# Patient Record
Sex: FEMALE | Race: BLACK OR AFRICAN AMERICAN | Employment: OTHER | URBAN - METROPOLITAN AREA
[De-identification: names, ages, dates, MRNs, and addresses within clinical notes are randomized per-mention and may not be internally consistent; named-entity substitution may affect disease eponyms.]

---

## 2022-06-26 ENCOUNTER — APPOINTMENT (OUTPATIENT)
Dept: GENERAL RADIOLOGY | Age: 77
DRG: 177 | End: 2022-06-26
Payer: MEDICARE

## 2022-06-26 ENCOUNTER — HOSPITAL ENCOUNTER (INPATIENT)
Age: 77
LOS: 3 days | Discharge: HOME | DRG: 177 | End: 2022-06-30
Attending: EMERGENCY MEDICINE | Admitting: INTERNAL MEDICINE
Payer: MEDICARE

## 2022-06-26 DIAGNOSIS — R09.02 HYPOXIA: ICD-10-CM

## 2022-06-26 DIAGNOSIS — R11.2 NAUSEA AND VOMITING, INTRACTABILITY OF VOMITING NOT SPECIFIED, UNSPECIFIED VOMITING TYPE: Primary | ICD-10-CM

## 2022-06-26 DIAGNOSIS — U07.1 COVID-19 VIRUS INFECTION: ICD-10-CM

## 2022-06-26 LAB
ALBUMIN SERPL-MCNC: 3.6 G/DL (ref 3.5–5.2)
ALBUMIN SERPL-MCNC: 3.7 G/DL (ref 3.5–5.2)
ALP BLD-CCNC: 79 U/L (ref 35–104)
ALP BLD-CCNC: 81 U/L (ref 35–104)
ALT SERPL-CCNC: 12 U/L (ref 0–32)
ALT SERPL-CCNC: 13 U/L (ref 0–32)
ANION GAP SERPL CALCULATED.3IONS-SCNC: 11 MMOL/L (ref 7–16)
ANION GAP SERPL CALCULATED.3IONS-SCNC: 11 MMOL/L (ref 7–16)
AST SERPL-CCNC: 18 U/L (ref 0–31)
AST SERPL-CCNC: 19 U/L (ref 0–31)
BASOPHILS ABSOLUTE: 0.02 E9/L (ref 0–0.2)
BASOPHILS RELATIVE PERCENT: 0.2 % (ref 0–2)
BILIRUB SERPL-MCNC: 0.2 MG/DL (ref 0–1.2)
BILIRUB SERPL-MCNC: 0.2 MG/DL (ref 0–1.2)
BUN BLDV-MCNC: 11 MG/DL (ref 6–23)
BUN BLDV-MCNC: 12 MG/DL (ref 6–23)
CALCIUM SERPL-MCNC: 8.4 MG/DL (ref 8.6–10.2)
CALCIUM SERPL-MCNC: 8.6 MG/DL (ref 8.6–10.2)
CHLORIDE BLD-SCNC: 102 MMOL/L (ref 98–107)
CHLORIDE BLD-SCNC: 104 MMOL/L (ref 98–107)
CO2: 22 MMOL/L (ref 22–29)
CO2: 23 MMOL/L (ref 22–29)
CREAT SERPL-MCNC: 0.8 MG/DL (ref 0.5–1)
CREAT SERPL-MCNC: 0.9 MG/DL (ref 0.5–1)
EOSINOPHILS ABSOLUTE: 0 E9/L (ref 0.05–0.5)
EOSINOPHILS RELATIVE PERCENT: 0 % (ref 0–6)
GFR AFRICAN AMERICAN: >60
GFR AFRICAN AMERICAN: >60
GFR NON-AFRICAN AMERICAN: >60 ML/MIN/1.73
GFR NON-AFRICAN AMERICAN: >60 ML/MIN/1.73
GLUCOSE BLD-MCNC: 147 MG/DL (ref 74–99)
GLUCOSE BLD-MCNC: 151 MG/DL (ref 74–99)
HCT VFR BLD CALC: 36.4 % (ref 34–48)
HEMOGLOBIN: 11.8 G/DL (ref 11.5–15.5)
IMMATURE GRANULOCYTES #: 0.04 E9/L
IMMATURE GRANULOCYTES %: 0.4 % (ref 0–5)
LACTIC ACID, SEPSIS: 1.2 MMOL/L (ref 0.5–1.9)
LACTIC ACID, SEPSIS: 1.6 MMOL/L (ref 0.5–1.9)
LIPASE: 24 U/L (ref 13–60)
LIPASE: 24 U/L (ref 13–60)
LYMPHOCYTES ABSOLUTE: 0.6 E9/L (ref 1.5–4)
LYMPHOCYTES RELATIVE PERCENT: 6.4 % (ref 20–42)
MCH RBC QN AUTO: 27.6 PG (ref 26–35)
MCHC RBC AUTO-ENTMCNC: 32.4 % (ref 32–34.5)
MCV RBC AUTO: 85.2 FL (ref 80–99.9)
MONOCYTES ABSOLUTE: 0.59 E9/L (ref 0.1–0.95)
MONOCYTES RELATIVE PERCENT: 6.3 % (ref 2–12)
NEUTROPHILS ABSOLUTE: 8.1 E9/L (ref 1.8–7.3)
NEUTROPHILS RELATIVE PERCENT: 86.7 % (ref 43–80)
PDW BLD-RTO: 15.5 FL (ref 11.5–15)
PLATELET # BLD: 261 E9/L (ref 130–450)
PMV BLD AUTO: 9.1 FL (ref 7–12)
POTASSIUM REFLEX MAGNESIUM: 4 MMOL/L (ref 3.5–5)
POTASSIUM REFLEX MAGNESIUM: 4.1 MMOL/L (ref 3.5–5)
PRO-BNP: 72 PG/ML (ref 0–450)
RBC # BLD: 4.27 E12/L (ref 3.5–5.5)
REASON FOR REJECTION: NORMAL
REJECTED TEST: NORMAL
SARS-COV-2, NAAT: DETECTED
SODIUM BLD-SCNC: 135 MMOL/L (ref 132–146)
SODIUM BLD-SCNC: 138 MMOL/L (ref 132–146)
TOTAL PROTEIN: 7.7 G/DL (ref 6.4–8.3)
TOTAL PROTEIN: 7.7 G/DL (ref 6.4–8.3)
WBC # BLD: 9.4 E9/L (ref 4.5–11.5)

## 2022-06-26 PROCEDURE — 99285 EMERGENCY DEPT VISIT HI MDM: CPT

## 2022-06-26 PROCEDURE — 87040 BLOOD CULTURE FOR BACTERIA: CPT

## 2022-06-26 PROCEDURE — 6360000002 HC RX W HCPCS: Performed by: EMERGENCY MEDICINE

## 2022-06-26 PROCEDURE — 85025 COMPLETE CBC W/AUTO DIFF WBC: CPT

## 2022-06-26 PROCEDURE — 6370000000 HC RX 637 (ALT 250 FOR IP): Performed by: EMERGENCY MEDICINE

## 2022-06-26 PROCEDURE — 96374 THER/PROPH/DIAG INJ IV PUSH: CPT

## 2022-06-26 PROCEDURE — 93005 ELECTROCARDIOGRAM TRACING: CPT | Performed by: EMERGENCY MEDICINE

## 2022-06-26 PROCEDURE — 83690 ASSAY OF LIPASE: CPT

## 2022-06-26 PROCEDURE — 83880 ASSAY OF NATRIURETIC PEPTIDE: CPT

## 2022-06-26 PROCEDURE — 87635 SARS-COV-2 COVID-19 AMP PRB: CPT

## 2022-06-26 PROCEDURE — 83605 ASSAY OF LACTIC ACID: CPT

## 2022-06-26 PROCEDURE — 2700000000 HC OXYGEN THERAPY PER DAY

## 2022-06-26 PROCEDURE — 80053 COMPREHEN METABOLIC PANEL: CPT

## 2022-06-26 PROCEDURE — 71045 X-RAY EXAM CHEST 1 VIEW: CPT

## 2022-06-26 PROCEDURE — 36415 COLL VENOUS BLD VENIPUNCTURE: CPT

## 2022-06-26 RX ORDER — ACETAMINOPHEN 325 MG/1
650 TABLET ORAL ONCE
Status: COMPLETED | OUTPATIENT
Start: 2022-06-26 | End: 2022-06-26

## 2022-06-26 RX ORDER — ONDANSETRON 2 MG/ML
4 INJECTION INTRAMUSCULAR; INTRAVENOUS ONCE
Status: COMPLETED | OUTPATIENT
Start: 2022-06-26 | End: 2022-06-26

## 2022-06-26 RX ADMIN — ACETAMINOPHEN 325MG 650 MG: 325 TABLET ORAL at 21:34

## 2022-06-26 RX ADMIN — ONDANSETRON 4 MG: 2 INJECTION INTRAMUSCULAR; INTRAVENOUS at 21:33

## 2022-06-27 ENCOUNTER — APPOINTMENT (OUTPATIENT)
Dept: CT IMAGING | Age: 77
DRG: 177 | End: 2022-06-27
Payer: MEDICARE

## 2022-06-27 PROBLEM — R09.02 HYPOXIA: Status: ACTIVE | Noted: 2022-06-27

## 2022-06-27 LAB
BILIRUBIN URINE: NEGATIVE
BLOOD, URINE: NEGATIVE
C-REACTIVE PROTEIN: 6 MG/DL (ref 0–0.4)
CLARITY: CLEAR
COLOR: YELLOW
D DIMER: 217 NG/ML DDU
EKG ATRIAL RATE: 108 BPM
EKG P AXIS: 66 DEGREES
EKG P-R INTERVAL: 194 MS
EKG Q-T INTERVAL: 288 MS
EKG QRS DURATION: 68 MS
EKG QTC CALCULATION (BAZETT): 385 MS
EKG R AXIS: 35 DEGREES
EKG T AXIS: 81 DEGREES
EKG VENTRICULAR RATE: 108 BPM
GLUCOSE URINE: NEGATIVE MG/DL
KETONES, URINE: NEGATIVE MG/DL
LEUKOCYTE ESTERASE, URINE: NEGATIVE
METER GLUCOSE: 119 MG/DL (ref 74–99)
METER GLUCOSE: 191 MG/DL (ref 74–99)
METER GLUCOSE: 195 MG/DL (ref 74–99)
NITRITE, URINE: NEGATIVE
PH UA: 6.5 (ref 5–9)
PROCALCITONIN: 0.15 NG/ML (ref 0–0.08)
PROTEIN UA: NEGATIVE MG/DL
SPECIFIC GRAVITY UA: 1.01 (ref 1–1.03)
TROPONIN, HIGH SENSITIVITY: <6 NG/L (ref 0–9)
UROBILINOGEN, URINE: 1 E.U./DL

## 2022-06-27 PROCEDURE — 84484 ASSAY OF TROPONIN QUANT: CPT

## 2022-06-27 PROCEDURE — 6370000000 HC RX 637 (ALT 250 FOR IP): Performed by: EMERGENCY MEDICINE

## 2022-06-27 PROCEDURE — 2580000003 HC RX 258: Performed by: RADIOLOGY

## 2022-06-27 PROCEDURE — 6370000000 HC RX 637 (ALT 250 FOR IP): Performed by: INTERNAL MEDICINE

## 2022-06-27 PROCEDURE — 36415 COLL VENOUS BLD VENIPUNCTURE: CPT

## 2022-06-27 PROCEDURE — 6360000004 HC RX CONTRAST MEDICATION: Performed by: RADIOLOGY

## 2022-06-27 PROCEDURE — 1200000000 HC SEMI PRIVATE

## 2022-06-27 PROCEDURE — 85378 FIBRIN DEGRADE SEMIQUANT: CPT

## 2022-06-27 PROCEDURE — 82962 GLUCOSE BLOOD TEST: CPT

## 2022-06-27 PROCEDURE — 81003 URINALYSIS AUTO W/O SCOPE: CPT

## 2022-06-27 PROCEDURE — 6360000002 HC RX W HCPCS: Performed by: INTERNAL MEDICINE

## 2022-06-27 PROCEDURE — 74177 CT ABD & PELVIS W/CONTRAST: CPT

## 2022-06-27 PROCEDURE — 86140 C-REACTIVE PROTEIN: CPT

## 2022-06-27 PROCEDURE — 71275 CT ANGIOGRAPHY CHEST: CPT

## 2022-06-27 PROCEDURE — 2700000000 HC OXYGEN THERAPY PER DAY

## 2022-06-27 PROCEDURE — 84145 PROCALCITONIN (PCT): CPT

## 2022-06-27 RX ORDER — BENZONATATE 100 MG/1
100 CAPSULE ORAL ONCE
Status: COMPLETED | OUTPATIENT
Start: 2022-06-27 | End: 2022-06-27

## 2022-06-27 RX ORDER — AMLODIPINE BESYLATE 5 MG/1
5 TABLET ORAL DAILY
Status: DISCONTINUED | OUTPATIENT
Start: 2022-06-27 | End: 2022-06-29

## 2022-06-27 RX ORDER — ACETAMINOPHEN 325 MG/1
650 TABLET ORAL EVERY 6 HOURS PRN
Status: DISCONTINUED | OUTPATIENT
Start: 2022-06-27 | End: 2022-06-30 | Stop reason: HOSPADM

## 2022-06-27 RX ORDER — AMLODIPINE BESYLATE 5 MG/1
5 TABLET ORAL DAILY
COMMUNITY
Start: 2021-12-09

## 2022-06-27 RX ORDER — PRAVASTATIN SODIUM 20 MG
20 TABLET ORAL NIGHTLY
COMMUNITY
Start: 2021-12-09

## 2022-06-27 RX ORDER — DEXTROSE MONOHYDRATE 25 G/50ML
12.5 INJECTION, SOLUTION INTRAVENOUS PRN
Status: DISCONTINUED | OUTPATIENT
Start: 2022-06-27 | End: 2022-06-30 | Stop reason: HOSPADM

## 2022-06-27 RX ORDER — DEXTROSE MONOHYDRATE 50 MG/ML
100 INJECTION, SOLUTION INTRAVENOUS PRN
Status: DISCONTINUED | OUTPATIENT
Start: 2022-06-27 | End: 2022-06-30 | Stop reason: HOSPADM

## 2022-06-27 RX ORDER — ACETAMINOPHEN 650 MG/1
650 SUPPOSITORY RECTAL EVERY 6 HOURS PRN
Status: DISCONTINUED | OUTPATIENT
Start: 2022-06-27 | End: 2022-06-30 | Stop reason: HOSPADM

## 2022-06-27 RX ORDER — INSULIN LISPRO 100 [IU]/ML
0-6 INJECTION, SOLUTION INTRAVENOUS; SUBCUTANEOUS NIGHTLY
Status: DISCONTINUED | OUTPATIENT
Start: 2022-06-27 | End: 2022-06-30 | Stop reason: HOSPADM

## 2022-06-27 RX ORDER — SODIUM CHLORIDE 0.9 % (FLUSH) 0.9 %
10 SYRINGE (ML) INJECTION PRN
Status: COMPLETED | OUTPATIENT
Start: 2022-06-27 | End: 2022-06-27

## 2022-06-27 RX ORDER — ENOXAPARIN SODIUM 100 MG/ML
30 INJECTION SUBCUTANEOUS 2 TIMES DAILY
Status: DISCONTINUED | OUTPATIENT
Start: 2022-06-27 | End: 2022-06-30 | Stop reason: HOSPADM

## 2022-06-27 RX ORDER — INSULIN LISPRO 100 [IU]/ML
0-12 INJECTION, SOLUTION INTRAVENOUS; SUBCUTANEOUS
Status: DISCONTINUED | OUTPATIENT
Start: 2022-06-27 | End: 2022-06-30 | Stop reason: HOSPADM

## 2022-06-27 RX ORDER — DEXAMETHASONE 4 MG/1
6 TABLET ORAL DAILY
Status: DISCONTINUED | OUTPATIENT
Start: 2022-06-27 | End: 2022-06-30 | Stop reason: HOSPADM

## 2022-06-27 RX ORDER — PRAVASTATIN SODIUM 20 MG
20 TABLET ORAL NIGHTLY
Status: DISCONTINUED | OUTPATIENT
Start: 2022-06-27 | End: 2022-06-30 | Stop reason: HOSPADM

## 2022-06-27 RX ADMIN — METFORMIN HYDROCHLORIDE 500 MG: 500 TABLET ORAL at 11:21

## 2022-06-27 RX ADMIN — BENZONATATE 100 MG: 100 CAPSULE ORAL at 03:08

## 2022-06-27 RX ADMIN — ENOXAPARIN SODIUM 30 MG: 100 INJECTION SUBCUTANEOUS at 21:37

## 2022-06-27 RX ADMIN — INSULIN LISPRO 2 UNITS: 100 INJECTION, SOLUTION INTRAVENOUS; SUBCUTANEOUS at 17:18

## 2022-06-27 RX ADMIN — DEXAMETHASONE 6 MG: 4 TABLET ORAL at 11:21

## 2022-06-27 RX ADMIN — Medication 10 ML: at 01:13

## 2022-06-27 RX ADMIN — ENOXAPARIN SODIUM 30 MG: 100 INJECTION SUBCUTANEOUS at 11:21

## 2022-06-27 RX ADMIN — INSULIN LISPRO 1 UNITS: 100 INJECTION, SOLUTION INTRAVENOUS; SUBCUTANEOUS at 21:00

## 2022-06-27 RX ADMIN — IOPAMIDOL 75 ML: 755 INJECTION, SOLUTION INTRAVENOUS at 01:13

## 2022-06-27 RX ADMIN — BARICITINIB 4 MG: 2 TABLET, FILM COATED ORAL at 17:28

## 2022-06-27 RX ADMIN — PRAVASTATIN SODIUM 20 MG: 20 TABLET ORAL at 21:36

## 2022-06-27 RX ADMIN — METFORMIN HYDROCHLORIDE 500 MG: 500 TABLET ORAL at 17:28

## 2022-06-27 RX ADMIN — AMLODIPINE BESYLATE 5 MG: 5 TABLET ORAL at 11:21

## 2022-06-27 ASSESSMENT — PAIN SCALES - GENERAL
PAINLEVEL_OUTOF10: 0
PAINLEVEL_OUTOF10: 0

## 2022-06-27 NOTE — H&P
Hospital Medicine History & Physical      PCP: No primary care provider on file. Date of Admission: 6/26/2022    Date of Service: Pt seen/examined on 6/27/2022 and Admitted to Inpatient with expected LOS greater than two midnights due to medical therapy. Chief Complaint: Cough, shortness of breath      History Of Present Illness:      68 y.o. female who presented to [facility] with cough. [facility] She then started having coughing issues. Symptoms have been going on for the last 2 days. She denies any wheezing or shortness of breath. Evaluation in the emergency room revealed hypoxia. She does not have a underlying history of lung disorder. Patient was then admitted because of COVID-19 infection and hypoxia. At the time of questioning she denies any significant issues. She has an underlying history of hypertension, hyperlipidemia, diabetes mellitus type 2. 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 Medications Prior to Admission:      Prior to Admission medications    Medication Sig Start Date End Date Taking? Authorizing Provider   amLODIPine (NORVASC) 5 MG tablet Take 5 mg by mouth daily 12/9/21  Yes Historical Provider, MD   metFORMIN (GLUCOPHAGE) 500 MG tablet Take 500 mg by mouth 2 times daily (with meals) 12/9/21  Yes Historical Provider, MD   pravastatin (PRAVACHOL) 20 MG tablet Take 20 mg by mouth nightly 12/9/21  Yes Historical Provider, MD       Allergies:  Patient has no known allergies. Social History:      TOBACCO:   has no history on file for tobacco use. ETOH:   has no history on file for alcohol use. Family History:          No family history on file.     REVIEW OF SYSTEMS:   Pertinent positives as noted in the HPI. All other systems reviewed and negative. PHYSICAL EXAM:    /80   Pulse (!) 101   Temp 99.1 °F (37.3 °C) (Oral)   Resp 24   Ht 5' 2\" (1.575 m)   Wt 135 lb (61.2 kg)   SpO2 98%   BMI 24.69 kg/m²     General appearance:  No apparent distress, appears stated age and cooperative. HEENT:  Normal cephalic, atraumatic without obvious deformity. Pupils equal, round, and reactive to light. Extra ocular muscles intact. Conjunctivae/corneas clear. Neck: Supple, with full range of motion. No jugular venous distention. Trachea midline. Respiratory:  Normal respiratory effort. Clear to auscultation, bilaterally without Rales/Wheezes/Rhonchi. Cardiovascular:  Regular rate and rhythm with normal S1/S2 without murmurs, rubs or gallops. Abdomen: Soft, non-tender, non-distended with normal bowel sounds. Musculoskeletal:  No clubbing, cyanosis or edema bilaterally. Full range of motion without deformity. Skin: Skin color, texture, turgor normal.  No rashes or lesions. Neurologic:  Neurovascularly intact without any focal sensory/motor deficits. Cranial nerves: II-XII intact, grossly non-focal.  Psychiatric:  Alert and oriented, thought content appropriate, normal insight        Labs:     Recent Labs     06/26/22  2323   WBC 9.4   HGB 11.8   HCT 36.4        Recent Labs     06/26/22  2140 06/26/22  2244    138   K 4.1 4.0    104   CO2 22 23   BUN 12 11   CREATININE 0.9 0.8   CALCIUM 8.4* 8.6     Recent Labs     06/26/22 2140 06/26/22  2244   AST 19 18   ALT 13 12   BILITOT 0.2 0.2   ALKPHOS 79 81     No results for input(s): INR in the last 72 hours. No results for input(s): [place] in the last 72 hours.     Urinalysis:      Lab Results   Component Value Date    NITRU Negative 06/27/2022    BLOODU Negative 06/27/2022    SPECGRAV 1.015 06/27/2022    GLUCOSEU Negative 06/27/2022         ASSESSMENT:    Active Hospital Problems    Diagnosis Date Noted    Hypoxia [R09.02] 06/27/2022     Priority: Medium   Hypertension  Hyperlipidemia  Diabetes mellitus type 2  COVID-19 infection with mild hypoxia  History of tobacco abuse  Prior history of hemorrhagic CVA with left-sided weakness    PLAN:  She is on 2 L of oxygen  CTA did not show any pulmonary embolism  Check CRP  Pharmacy regarding possible remdesivir versus baricitinib based on CRP level      DVT Prophylaxis: Lovenox  Diet: ADULT DIET;  Regular; 4 carb choices (60 gm/meal)  Code Status: Full Code      Dispo -remains inpatient       Babs Thomas MD

## 2022-06-27 NOTE — ED PROVIDER NOTES
HPI:  6/26/22, Time: 8:27 PM EDT         Freedom Zacarias is a 68 y.o. female presenting to the ED for nausea vomiting, beginning 1 hour ago. The complaint has been persistent, moderate in severity, and worsened by nothing. Patient presenting here because the nausea vomiting. Patient having no abdominal pain. Patient reporting no chest pain she reports a mild cough. Patient reports that she was in a family reunion yesterday. Patient reports no illness up until today. Patient's had no diarrhea there is no history of hematemesis there is no black or tarry stools. Patient reporting no leg pain or swelling. Patient reporting no headache. Patient reporting no syncopal event. ROS:   Pertinent positives and negatives are stated within HPI, all other systems reviewed and are negative.  --------------------------------------------- PAST HISTORY ---------------------------------------------  Past Medical History:  has no past medical history on file. Past Surgical History:  has no past surgical history on file. Social History:      Family History: family history is not on file. The patients home medications have been reviewed. Allergies: Patient has no known allergies. ---------------------------------------------------PHYSICAL EXAM--------------------------------------    Constitutional/General: Alert and oriented x3,  Head: Normocephalic and atraumatic  Eyes: PERRL, EOMI  Mouth: Oropharynx clear, handling secretions, no trismus  Neck: Supple, full ROM, non tender to palpation in the midline, no stridor, no crepitus, no meningeal signs  Pulmonary: Lungs crackles  Cardiovascular: Tachycardic regular rhythm. No murmurs, gallops, or rubs. 2+ distal pulses  Chest: no chest wall tenderness  Abdomen: Soft. Non tender. Non distended. +BS. No rebound, guarding, or rigidity. No pulsatile masses appreciated. Musculoskeletal: Moves all extremities x 4.  Warm and well perfused, no clubbing, cyanosis, or edema. Capillary refill <3 seconds  Skin: warm and dry. No rashes. Neurologic: GCS 15, CN 2-12 grossly intact, no focal deficits, symmetric strength 5/5 in the upper and lower extremities bilaterally  Psych: Normal Affect    -------------------------------------------------- RESULTS -------------------------------------------------  I have personally reviewed all laboratory and imaging results for this patient. Results are listed below.      LABS:  Results for orders placed or performed during the hospital encounter of 06/26/22   COVID-19, Rapid    Specimen: Nasopharyngeal Swab   Result Value Ref Range    SARS-CoV-2, NAAT DETECTED (A) Not Detected   Lactate, Sepsis   Result Value Ref Range    Lactic Acid, Sepsis 1.6 0.5 - 1.9 mmol/L   Lactate, Sepsis   Result Value Ref Range    Lactic Acid, Sepsis 1.2 0.5 - 1.9 mmol/L   Comprehensive Metabolic Panel w/ Reflex to MG   Result Value Ref Range    Sodium 135 132 - 146 mmol/L    Potassium reflex Magnesium 4.1 3.5 - 5.0 mmol/L    Chloride 102 98 - 107 mmol/L    CO2 22 22 - 29 mmol/L    Anion Gap 11 7 - 16 mmol/L    Glucose 151 (H) 74 - 99 mg/dL    BUN 12 6 - 23 mg/dL    CREATININE 0.9 0.5 - 1.0 mg/dL    GFR Non-African American >60 >=60 mL/min/1.73    GFR African American >60     Calcium 8.4 (L) 8.6 - 10.2 mg/dL    Total Protein 7.7 6.4 - 8.3 g/dL    Albumin 3.7 3.5 - 5.2 g/dL    Total Bilirubin 0.2 0.0 - 1.2 mg/dL    Alkaline Phosphatase 79 35 - 104 U/L    ALT 13 0 - 32 U/L    AST 19 0 - 31 U/L   Urinalysis   Result Value Ref Range    Color, UA Yellow Straw/Yellow    Clarity, UA Clear Clear    Glucose, Ur Negative Negative mg/dL    Bilirubin Urine Negative Negative    Ketones, Urine Negative Negative mg/dL    Specific Gravity, UA 1.015 1.005 - 1.030    Blood, Urine Negative Negative    pH, UA 6.5 5.0 - 9.0    Protein, UA Negative Negative mg/dL    Urobilinogen, Urine 1.0 <2.0 E.U./dL    Nitrite, Urine Negative Negative    Leukocyte Esterase, Urine Negative Negative Lipase   Result Value Ref Range    Lipase 24 13 - 60 U/L   SPECIMEN REJECTION   Result Value Ref Range    Rejected Test CBCWD     Reason for Rejection see below    Brain Natriuretic Peptide   Result Value Ref Range    Pro-BNP 72 0 - 450 pg/mL   Comprehensive Metabolic Panel w/ Reflex to MG   Result Value Ref Range    Sodium 138 132 - 146 mmol/L    Potassium reflex Magnesium 4.0 3.5 - 5.0 mmol/L    Chloride 104 98 - 107 mmol/L    CO2 23 22 - 29 mmol/L    Anion Gap 11 7 - 16 mmol/L    Glucose 147 (H) 74 - 99 mg/dL    BUN 11 6 - 23 mg/dL    CREATININE 0.8 0.5 - 1.0 mg/dL    GFR Non-African American >60 >=60 mL/min/1.73    GFR African American >60     Calcium 8.6 8.6 - 10.2 mg/dL    Total Protein 7.7 6.4 - 8.3 g/dL    Albumin 3.6 3.5 - 5.2 g/dL    Total Bilirubin 0.2 0.0 - 1.2 mg/dL    Alkaline Phosphatase 81 35 - 104 U/L    ALT 12 0 - 32 U/L    AST 18 0 - 31 U/L   Lipase   Result Value Ref Range    Lipase 24 13 - 60 U/L   CBC with Auto Differential   Result Value Ref Range    WBC 9.4 4.5 - 11.5 E9/L    RBC 4.27 3.50 - 5.50 E12/L    Hemoglobin 11.8 11.5 - 15.5 g/dL    Hematocrit 36.4 34.0 - 48.0 %    MCV 85.2 80.0 - 99.9 fL    MCH 27.6 26.0 - 35.0 pg    MCHC 32.4 32.0 - 34.5 %    RDW 15.5 (H) 11.5 - 15.0 fL    Platelets 128 773 - 078 E9/L    MPV 9.1 7.0 - 12.0 fL    Neutrophils % 86.7 (H) 43.0 - 80.0 %    Immature Granulocytes % 0.4 0.0 - 5.0 %    Lymphocytes % 6.4 (L) 20.0 - 42.0 %    Monocytes % 6.3 2.0 - 12.0 %    Eosinophils % 0.0 0.0 - 6.0 %    Basophils % 0.2 0.0 - 2.0 %    Neutrophils Absolute 8.10 (H) 1.80 - 7.30 E9/L    Immature Granulocytes # 0.04 E9/L    Lymphocytes Absolute 0.60 (L) 1.50 - 4.00 E9/L    Monocytes Absolute 0.59 0.10 - 0.95 E9/L    Eosinophils Absolute 0.00 (L) 0.05 - 0.50 E9/L    Basophils Absolute 0.02 0.00 - 0.20 E9/L   EKG 12 lead   Result Value Ref Range    Ventricular Rate 108 BPM    Atrial Rate 108 BPM    P-R Interval 194 ms    QRS Duration 68 ms    Q-T Interval 288 ms    QTc COVID testing was positive. Abdomen is soft. Patient did vomit here again. Patient was noted to be extremely hypoxic with ambulation. Patient x-ray shows no findings of infiltrate. CT of the chest and abdomen were ordered. Patient reexamined multiple times was given Tylenol and the fever has come down. Patient will be admitted to intermediate bed. Re-Evaluations:             Patient reevaluated several times here in the emergency room. Patient noted to be hypoxic here with exertion. Pulse ox dropped to 80%. Patient having no chest pain her abdomen is soft there is no rebound or guarding. Chest x-ray is negative she is COVID-positive. Patient did undergo CTs of her chest and her abdomen. Patient still requiring supplemental oxygen. Consultations:             Internal medicine    Critical Care:     Please note that the withdrawal or failure to initiate urgent interventions for this patient would likely result in a life threatening deterioration or permanent disability. Accordingly this patient received 30 minutes of critical care time, excluding separately billable procedures. This patient's ED course included: a personal history and physicial eaxmination    This patient has been closely monitored during their ED course. Counseling: The emergency provider has spoken with the patient and discussed todays results, in addition to providing specific details for the plan of care and counseling regarding the diagnosis and prognosis. Questions are answered at this time and they are agreeable with the plan.       --------------------------------- IMPRESSION AND DISPOSITION ---------------------------------    IMPRESSION  1. Nausea and vomiting, intractability of vomiting not specified, unspecified vomiting type    2. Hypoxia    3.  COVID-19 virus infection        DISPOSITION  Disposition: Admit to telemetry  Patient condition is stable        NOTE: This report was transcribed using voice

## 2022-06-28 LAB
ALBUMIN SERPL-MCNC: 3.6 G/DL (ref 3.5–5.2)
ALP BLD-CCNC: 72 U/L (ref 35–104)
ALT SERPL-CCNC: 18 U/L (ref 0–32)
ANION GAP SERPL CALCULATED.3IONS-SCNC: 10 MMOL/L (ref 7–16)
AST SERPL-CCNC: 37 U/L (ref 0–31)
BASOPHILS ABSOLUTE: 0.01 E9/L (ref 0–0.2)
BASOPHILS RELATIVE PERCENT: 0.2 % (ref 0–2)
BILIRUB SERPL-MCNC: <0.2 MG/DL (ref 0–1.2)
BUN BLDV-MCNC: 12 MG/DL (ref 6–23)
C-REACTIVE PROTEIN: 9.2 MG/DL (ref 0–0.4)
CALCIUM SERPL-MCNC: 8.7 MG/DL (ref 8.6–10.2)
CHLORIDE BLD-SCNC: 102 MMOL/L (ref 98–107)
CO2: 25 MMOL/L (ref 22–29)
CREAT SERPL-MCNC: 0.8 MG/DL (ref 0.5–1)
D DIMER: 224 NG/ML DDU
EOSINOPHILS ABSOLUTE: 0 E9/L (ref 0.05–0.5)
EOSINOPHILS RELATIVE PERCENT: 0 % (ref 0–6)
GFR AFRICAN AMERICAN: >60
GFR NON-AFRICAN AMERICAN: >60 ML/MIN/1.73
GLUCOSE BLD-MCNC: 177 MG/DL (ref 74–99)
HCT VFR BLD CALC: 37.2 % (ref 34–48)
HEMOGLOBIN: 11.8 G/DL (ref 11.5–15.5)
IMMATURE GRANULOCYTES #: 0.02 E9/L
IMMATURE GRANULOCYTES %: 0.4 % (ref 0–5)
LYMPHOCYTES ABSOLUTE: 1.42 E9/L (ref 1.5–4)
LYMPHOCYTES RELATIVE PERCENT: 31.1 % (ref 20–42)
MCH RBC QN AUTO: 27.4 PG (ref 26–35)
MCHC RBC AUTO-ENTMCNC: 31.7 % (ref 32–34.5)
MCV RBC AUTO: 86.5 FL (ref 80–99.9)
METER GLUCOSE: 141 MG/DL (ref 74–99)
METER GLUCOSE: 148 MG/DL (ref 74–99)
METER GLUCOSE: 150 MG/DL (ref 74–99)
MONOCYTES ABSOLUTE: 0.57 E9/L (ref 0.1–0.95)
MONOCYTES RELATIVE PERCENT: 12.5 % (ref 2–12)
NEUTROPHILS ABSOLUTE: 2.54 E9/L (ref 1.8–7.3)
NEUTROPHILS RELATIVE PERCENT: 55.8 % (ref 43–80)
PDW BLD-RTO: 15.8 FL (ref 11.5–15)
PLATELET # BLD: 282 E9/L (ref 130–450)
PMV BLD AUTO: 9.6 FL (ref 7–12)
POTASSIUM REFLEX MAGNESIUM: 4.6 MMOL/L (ref 3.5–5)
RBC # BLD: 4.3 E12/L (ref 3.5–5.5)
SODIUM BLD-SCNC: 137 MMOL/L (ref 132–146)
TOTAL PROTEIN: 7.7 G/DL (ref 6.4–8.3)
VITAMIN D 25-HYDROXY: 16 NG/ML (ref 30–100)
WBC # BLD: 4.6 E9/L (ref 4.5–11.5)

## 2022-06-28 PROCEDURE — 36415 COLL VENOUS BLD VENIPUNCTURE: CPT

## 2022-06-28 PROCEDURE — 2700000000 HC OXYGEN THERAPY PER DAY

## 2022-06-28 PROCEDURE — XW0DXM6 INTRODUCTION OF BARICITINIB INTO MOUTH AND PHARYNX, EXTERNAL APPROACH, NEW TECHNOLOGY GROUP 6: ICD-10-PCS | Performed by: EMERGENCY MEDICINE

## 2022-06-28 PROCEDURE — 6370000000 HC RX 637 (ALT 250 FOR IP): Performed by: INTERNAL MEDICINE

## 2022-06-28 PROCEDURE — 82306 VITAMIN D 25 HYDROXY: CPT

## 2022-06-28 PROCEDURE — 6360000002 HC RX W HCPCS: Performed by: INTERNAL MEDICINE

## 2022-06-28 PROCEDURE — 3E0333Z INTRODUCTION OF ANTI-INFLAMMATORY INTO PERIPHERAL VEIN, PERCUTANEOUS APPROACH: ICD-10-PCS | Performed by: EMERGENCY MEDICINE

## 2022-06-28 PROCEDURE — 85025 COMPLETE CBC W/AUTO DIFF WBC: CPT

## 2022-06-28 PROCEDURE — 86140 C-REACTIVE PROTEIN: CPT

## 2022-06-28 PROCEDURE — 80053 COMPREHEN METABOLIC PANEL: CPT

## 2022-06-28 PROCEDURE — 82962 GLUCOSE BLOOD TEST: CPT

## 2022-06-28 PROCEDURE — 1200000000 HC SEMI PRIVATE

## 2022-06-28 PROCEDURE — 85378 FIBRIN DEGRADE SEMIQUANT: CPT

## 2022-06-28 RX ADMIN — BARICITINIB 4 MG: 2 TABLET, FILM COATED ORAL at 09:52

## 2022-06-28 RX ADMIN — INSULIN LISPRO 2 UNITS: 100 INJECTION, SOLUTION INTRAVENOUS; SUBCUTANEOUS at 09:33

## 2022-06-28 RX ADMIN — AMLODIPINE BESYLATE 5 MG: 5 TABLET ORAL at 09:52

## 2022-06-28 RX ADMIN — ENOXAPARIN SODIUM 30 MG: 100 INJECTION SUBCUTANEOUS at 21:00

## 2022-06-28 RX ADMIN — PRAVASTATIN SODIUM 20 MG: 20 TABLET ORAL at 21:00

## 2022-06-28 RX ADMIN — METFORMIN HYDROCHLORIDE 500 MG: 500 TABLET ORAL at 09:52

## 2022-06-28 RX ADMIN — METFORMIN HYDROCHLORIDE 500 MG: 500 TABLET ORAL at 18:45

## 2022-06-28 RX ADMIN — DEXAMETHASONE 6 MG: 4 TABLET ORAL at 09:52

## 2022-06-28 ASSESSMENT — PAIN SCALES - GENERAL
PAINLEVEL_OUTOF10: 0

## 2022-06-28 NOTE — PROGRESS NOTES
Hospitalist Progress Note      PCP: No primary care provider on file. Date of Admission: 6/26/2022    Chief Complaint: Cough, generalized weakness    Hospital Course: Presented to the emergency room because of cough, generalized weakness. Patient was noted to be hypoxic. She was positive for COVID-19 infection. Patient was then admitted for further evaluation treatment. She was started on steroids, baricitinib. Subjective: Feeling better  Cough is about the same      Medications:  Reviewed    Infusion Medications    dextrose       Scheduled Medications    amLODIPine  5 mg Oral Daily    metFORMIN  500 mg Oral BID WC    pravastatin  20 mg Oral Nightly    enoxaparin  30 mg SubCUTAneous BID    dexamethasone  6 mg Oral Daily    insulin lispro  0-12 Units SubCUTAneous TID WC    insulin lispro  0-6 Units SubCUTAneous Nightly    baricitinib  4 mg Oral Daily     PRN Meds: acetaminophen **OR** acetaminophen, glucose, glucagon (rDNA), dextrose, dextrose    No intake or output data in the 24 hours ending 06/28/22 1018    Exam:    BP (!) 145/85   Pulse 88   Temp 97.7 °F (36.5 °C) (Oral)   Resp 17   Ht 5' 2\" (1.575 m)   Wt 135 lb (61.2 kg)   SpO2 97%   BMI 24.69 kg/m²     General appearance: No apparent distress, appears stated age and cooperative. HEENT: Pupils equal, round, and reactive to light. Conjunctivae/corneas clear. Neck: Supple, with full range of motion. No jugular venous distention. Trachea midline. Respiratory:  Normal respiratory effort. Clear to auscultation, bilaterally without Rales/Wheezes/Rhonchi. Cardiovascular: Regular rate and rhythm with normal S1/S2 without murmurs, rubs or gallops. Abdomen: Soft, non-tender, non-distended with normal bowel sounds. Musculoskeletal: No clubbing, cyanosis or edema bilaterally. Full range of motion without deformity. Skin: Skin color, texture, turgor normal.  No rashes or lesions.   Neurologic:  Neurovascularly intact without any focal sensory/motor deficits. Cranial nerves: II-XII intact, grossly non-focal.  Psychiatric: Alert and oriented, thought content appropriate, normal insight    Labs:   Recent Labs     06/26/22 2323 06/28/22  0450   WBC 9.4 4.6   HGB 11.8 11.8   HCT 36.4 37.2    282     Recent Labs     06/26/22 2140 06/26/22 2244 06/28/22  0450    138 137   K 4.1 4.0 4.6    104 102   CO2 22 23 25   BUN 12 11 12   CREATININE 0.9 0.8 0.8   CALCIUM 8.4* 8.6 8.7     Recent Labs     06/26/22 2140 06/26/22 2244 06/28/22  0450   AST 19 18 37*   ALT 13 12 18   BILITOT 0.2 0.2 <0.2   ALKPHOS 79 81 72     No results for input(s): INR in the last 72 hours. No results for input(s): [place] in the last 72 hours. Assessment/Plan:    Active Hospital Problems    Diagnosis Date Noted    Hypoxia [R09.02] 06/27/2022     Priority: Medium   COVID-19 infection  Hypertension by history  Hyperlipidemia  Diabetes mellitus type 2  Ex-smoker  Previous history of hemorrhagic stroke    Currently on dexamethasone, baricitinib  CRP has worsened to 9.2-monitor closely  No discharge plan today  Wean oxygen as able  Procalcitonin is elevated 0.15    DVT Prophylaxis: Lovenox   diet: ADULT DIET;  Regular; 4 carb choices (60 gm/meal)  Code Status: Full Code        Dispo -Home once stable    Babs Thomas MD

## 2022-06-28 NOTE — PROGRESS NOTES
Pharmacy Consultation Note    Consult date: 6/28/2022  Physician/provider: Dr. Prabhakar Alfaro has been consulted to evaluate criteria for Baricitinib or Remdesivir therapy. Based on the algorithm, the patient DOES currently meet [facility] P&T approved Covid-19 treatment criteria for Baricitinib. Discussed with Dr. Richard Griffith.      Thank you for the consult,  Clovis Prater [facility]

## 2022-06-28 NOTE — PLAN OF CARE
Problem: Discharge Planning  Goal: Discharge to home or other facility with appropriate resources  Outcome: Progressing     Problem: Safety - Adult  Goal: Free from fall injury  Outcome: Progressing     Problem: ABCDS Injury Assessment  Goal: Absence of physical injury  Outcome: Progressing     Problem: Pain  Goal: Verbalizes/displays adequate comfort level or baseline comfort level  Outcome: Progressing  Flowsheets (Taken 6/28/2022 0402)  Verbalizes/displays adequate comfort level or baseline comfort level: Encourage patient to monitor pain and request assistance

## 2022-06-28 NOTE — PLAN OF CARE
Problem: Discharge Planning  Goal: Discharge to home or other facility with appropriate resources  6/28/2022 1247 by Ayde Danielle RN  Outcome: Progressing  6/28/2022 0422 by Mari Churchill RN  Outcome: Progressing     Problem: Safety - Adult  Goal: Free from fall injury  6/28/2022 1247 by Ayde Danielle RN  Outcome: Progressing  6/28/2022 0422 by Mari Churchill RN  Outcome: Progressing     Problem: ABCDS Injury Assessment  Goal: Absence of physical injury  6/28/2022 1247 by Ayde Danielle RN  Outcome: Progressing  6/28/2022 0422 by Mari Churchill RN  Outcome: Progressing     Problem: Pain  Goal: Verbalizes/displays adequate comfort level or baseline comfort level  6/28/2022 1247 by Ayde Danielle RN  Outcome: Progressing  6/28/2022 0422 by Mari Churchill RN  Outcome: Progressing  Flowsheets (Taken 6/28/2022 0402)  Verbalizes/displays adequate comfort level or baseline comfort level: Encourage patient to monitor pain and request assistance

## 2022-06-29 LAB
C-REACTIVE PROTEIN: 4.5 MG/DL (ref 0–0.4)
D DIMER: 204 NG/ML DDU
METER GLUCOSE: 127 MG/DL (ref 74–99)
METER GLUCOSE: 128 MG/DL (ref 74–99)
METER GLUCOSE: 186 MG/DL (ref 74–99)
METER GLUCOSE: 194 MG/DL (ref 74–99)
PROCALCITONIN: 0.08 NG/ML (ref 0–0.08)

## 2022-06-29 PROCEDURE — 6360000002 HC RX W HCPCS: Performed by: INTERNAL MEDICINE

## 2022-06-29 PROCEDURE — 6370000000 HC RX 637 (ALT 250 FOR IP): Performed by: EMERGENCY MEDICINE

## 2022-06-29 PROCEDURE — 36415 COLL VENOUS BLD VENIPUNCTURE: CPT

## 2022-06-29 PROCEDURE — 82962 GLUCOSE BLOOD TEST: CPT

## 2022-06-29 PROCEDURE — 86140 C-REACTIVE PROTEIN: CPT

## 2022-06-29 PROCEDURE — 1200000000 HC SEMI PRIVATE

## 2022-06-29 PROCEDURE — 84145 PROCALCITONIN (PCT): CPT

## 2022-06-29 PROCEDURE — 85378 FIBRIN DEGRADE SEMIQUANT: CPT

## 2022-06-29 PROCEDURE — 6370000000 HC RX 637 (ALT 250 FOR IP): Performed by: INTERNAL MEDICINE

## 2022-06-29 RX ORDER — AMLODIPINE BESYLATE 10 MG/1
10 TABLET ORAL DAILY
Status: DISCONTINUED | OUTPATIENT
Start: 2022-06-29 | End: 2022-06-30 | Stop reason: HOSPADM

## 2022-06-29 RX ORDER — LIDOCAINE HYDROCHLORIDE 10 MG/ML
INJECTION, SOLUTION INFILTRATION; PERINEURAL
Status: DISCONTINUED
Start: 2022-06-29 | End: 2022-06-29 | Stop reason: WASHOUT

## 2022-06-29 RX ADMIN — BARICITINIB 4 MG: 2 TABLET, FILM COATED ORAL at 09:59

## 2022-06-29 RX ADMIN — INSULIN LISPRO 1 UNITS: 100 INJECTION, SOLUTION INTRAVENOUS; SUBCUTANEOUS at 23:50

## 2022-06-29 RX ADMIN — AMLODIPINE BESYLATE 10 MG: 10 TABLET ORAL at 09:58

## 2022-06-29 RX ADMIN — ENOXAPARIN SODIUM 30 MG: 100 INJECTION SUBCUTANEOUS at 09:58

## 2022-06-29 RX ADMIN — METFORMIN HYDROCHLORIDE 500 MG: 500 TABLET ORAL at 09:58

## 2022-06-29 RX ADMIN — PRAVASTATIN SODIUM 20 MG: 20 TABLET ORAL at 23:43

## 2022-06-29 RX ADMIN — DEXAMETHASONE 6 MG: 4 TABLET ORAL at 09:59

## 2022-06-29 RX ADMIN — ENOXAPARIN SODIUM 30 MG: 100 INJECTION SUBCUTANEOUS at 23:48

## 2022-06-29 RX ADMIN — METFORMIN HYDROCHLORIDE 500 MG: 500 TABLET ORAL at 16:42

## 2022-06-29 ASSESSMENT — PAIN SCALES - GENERAL
PAINLEVEL_OUTOF10: 0

## 2022-06-29 NOTE — PROGRESS NOTES
Hospitalist Progress Note    Chief complaint:  Chief Complaint   Patient presents with    Emesis     Pt states she may have eating some bad chicken the day prior and states she has vomited multiple times today started earlier. Pt states she did vomited in her sleep and woke up from it which possibly caused her to asperate. Lung sounds wheezy and pt 86% on RA. No history of need for O2. Admit date:  6/26/2022    Days in hospital:    2    Synopsis :    68 y.o. female who presented to [facility] with cough. Patient is visiting [facility]. [facility] She then started having coughing issues. Symptoms have been going on for the last 2 days. She denies any wheezing or shortness of breath. Evaluation in the emergency room revealed hypoxia. She does not have a underlying history of lung disorder. Patient was then admitted because of COVID-19 infection and hypoxia. At the time of questioning she denies any significant issues. She has an underlying history of hypertension, hyperlipidemia, diabetes mellitus type 2. Assessment and plan:  Principal Problem:    Hypoxia  Resolved Problems:    * No resolved hospital problems. *    Acute hypoxic respiratory failure  Resolved not requiring oxygen now  Continue Decadron, monitor for oxygen requirements  Discontinue baricitinib  Continue Lovenox for DVT prophylaxis  If remained stable will plan for discharge likely tomorrow    Hypertension  Continue Norvasc    Diabetes mellitus  Continues on sliding scale    Hyperlipidemia:   Continue pravastatin    DVT prophylaxis: Subcutaneous heparin  CODE STATUS: Patient is a full code  Discharge plan: Patient can be discharged next 1-2 days to home with and without home health.,  Pending clinical improvement, pending work-up and clearance by consulting services.     Subjective:   No acute events overnight, this morning patient seen at bedside no acute distress. Patient is not reporting any chest pain. No obvious difficulty in breathing at this point and no worsening of any dyspnea reported. No abdominal pain or discomfort. No dizziness. No new complaint. All questions answered in detail at bedside. Plan discussed with nursing staff in detail. Objective:    Physical examination:  VS: BP (!) 142/78   Pulse 96   Temp 98.1 °F (36.7 °C) (Oral)   Resp 18   Ht 5' 2\" (1.575 m)   Wt 135 lb (61.2 kg)   SpO2 95%   BMI 24.69 kg/m²   I/O:     Intake/Output Summary (Last 24 hours) at 6/29/2022 1949  Last data filed at 6/29/2022 1843  Gross per 24 hour   Intake 240 ml   Output 100 ml   Net 140 ml     Patient was visualized at bedside, at this time no acute distress. Patient appears clinically stable  HEENT: normocephalic and atraumatic, pupils equal, round, and reactive to light, Ssupple and non-tender without mass  Abdomen: Non-distended. No discomfort or pain reported   Extremities: no cyanosis, clubbing or edema  Neurological: alert, awake, observational exam grossly nonfocal  Due to the current efforts to prevent transmission of COVID-19 and also the need to preserve PPE for other caregivers full physical examination was not performed  That being said, all relevant records and diagnostic tests were reviewed, including laboratory results and imaging.     Plan discussed with nursing staff        Medications:  Scheduled Meds:   amLODIPine  10 mg Oral Daily    metFORMIN  500 mg Oral BID WC    pravastatin  20 mg Oral Nightly    enoxaparin  30 mg SubCUTAneous BID    dexamethasone  6 mg Oral Daily    insulin lispro  0-12 Units SubCUTAneous TID WC    insulin lispro  0-6 Units SubCUTAneous Nightly    baricitinib  4 mg Oral Daily       PRN Meds:  acetaminophen **OR** acetaminophen, glucose, glucagon (rDNA), dextrose, dextrose    IV:   dextrose         LABS:  Recent Results (from the past 24 hour(s))   POCT Glucose    Collection Time: STOMACH:  Unremarkable. BOWEL: Small bowel:  Unremarkable. Large bowel: The colon and rectum are of normal course and caliber. The appendix is not visualized. There is no intraperitoneal free air or fluid. URINARY/GENITAL TRACT: Kidneys:  The kidneys enhance symmetrically. No evidence of hydronephrosis, renal calcifications or solid renal mass. Ureters: The ureters are normal course and caliber. There is no evidence of ureter calculus/calculi. URINARY BLADDER:  The urinary bladder is well distended without wall thickening or focal mass. REPRODUCTIVE ORGANS:  There is a fibroid uterus identified. BLOOD VESSELS: Unremarkable given patients age. LYMPH NODES:  No evidence of intraabdominal or intrapelvic lymphadenopathy. ABDOMINAL WALL & SOFT TISSUES:  Unremarkable. OSSEOUS STRUCTURES: No acute osseous lesion. No acute intraabdominal or intrapelvic pathology. Fibroid uterus. XR CHEST PORTABLE    Result Date: 6/26/2022  EXAMINATION: ONE XRAY VIEW OF THE CHEST 6/26/2022 8:57 pm COMPARISON: None. HISTORY: ORDERING SYSTEM PROVIDED HISTORY: hypoxia TECHNOLOGIST PROVIDED HISTORY: Reason for exam:->hypoxia What reading provider will be dictating this exam?->CRC FINDINGS: Cardiac size within normal limits. Pulmonary vascularity within normal is. Minimal subsegmental atelectasis. No pneumothorax or pleural effusion. No acute cardiopulmonary process     CTA CHEST W CONTRAST    Result Date: 6/27/2022  EXAMINATION: CTA OF THE CHEST 6/27/2022 1:11 am TECHNIQUE: CTA of the chest was performed after the administration of intravenous contrast.  Multiplanar reformatted images are provided for review. MIP images are provided for review. Automated exposure control, iterative reconstruction, and/or weight based adjustment of the mA/kV was utilized to reduce the radiation dose to as low as reasonably achievable. COMPARISON: None.  HISTORY: ORDERING SYSTEM PROVIDED HISTORY: Nausea vomiting hypoxic TECHNOLOGIST PROVIDED HISTORY: Reason for exam:->Nausea vomiting hypoxic Decision Support Exception - unselect if not a suspected or confirmed emergency medical condition->Emergency Medical Condition (MA) What reading provider will be dictating this exam?->CRC FINDINGS: Pulmonary Arteries: Pulmonary arteries are adequately opacified for evaluation. No evidence of intraluminal filling defect to suggest pulmonary embolism. Main pulmonary artery is normal in caliber. Mediastinum: No evidence of mediastinal lymphadenopathy. The heart and pericardium demonstrate no acute abnormality. There is no acute abnormality of the thoracic aorta. Lungs/pleura: The lungs are without acute process. No focal consolidation or pulmonary edema. No evidence of pleural effusion or pneumothorax. Upper Abdomen: Limited images of the upper abdomen are unremarkable. Soft Tissues/Bones: No acute bone or soft tissue abnormality. No evidence of pulmonary embolism or acute pulmonary abnormality. Electronically signed by Darinel Soto MD on 6/29/2022 at 7:49 PM  NOTE: This report was transcribed using voice recognition software. Every effort was made to ensure accuracy; however, inadvertent computerized transcription errors may be present.

## 2022-06-30 VITALS
HEIGHT: 62 IN | BODY MASS INDEX: 24.84 KG/M2 | DIASTOLIC BLOOD PRESSURE: 76 MMHG | TEMPERATURE: 97.5 F | HEART RATE: 99 BPM | RESPIRATION RATE: 18 BRPM | SYSTOLIC BLOOD PRESSURE: 142 MMHG | OXYGEN SATURATION: 95 % | WEIGHT: 135 LBS

## 2022-06-30 LAB
ANION GAP SERPL CALCULATED.3IONS-SCNC: 11 MMOL/L (ref 7–16)
BUN BLDV-MCNC: 18 MG/DL (ref 6–23)
CALCIUM SERPL-MCNC: 9.5 MG/DL (ref 8.6–10.2)
CHLORIDE BLD-SCNC: 101 MMOL/L (ref 98–107)
CO2: 28 MMOL/L (ref 22–29)
CREAT SERPL-MCNC: 0.8 MG/DL (ref 0.5–1)
GFR AFRICAN AMERICAN: >60
GFR NON-AFRICAN AMERICAN: >60 ML/MIN/1.73
GLUCOSE BLD-MCNC: 108 MG/DL (ref 74–99)
HCT VFR BLD CALC: 40.9 % (ref 34–48)
HEMOGLOBIN: 12.9 G/DL (ref 11.5–15.5)
MCH RBC QN AUTO: 27.2 PG (ref 26–35)
MCHC RBC AUTO-ENTMCNC: 31.5 % (ref 32–34.5)
MCV RBC AUTO: 86.3 FL (ref 80–99.9)
METER GLUCOSE: 103 MG/DL (ref 74–99)
METER GLUCOSE: 129 MG/DL (ref 74–99)
PDW BLD-RTO: 15.8 FL (ref 11.5–15)
PLATELET # BLD: 328 E9/L (ref 130–450)
PMV BLD AUTO: 9.5 FL (ref 7–12)
POTASSIUM SERPL-SCNC: 4.2 MMOL/L (ref 3.5–5)
RBC # BLD: 4.74 E12/L (ref 3.5–5.5)
SODIUM BLD-SCNC: 140 MMOL/L (ref 132–146)
WBC # BLD: 7.2 E9/L (ref 4.5–11.5)

## 2022-06-30 PROCEDURE — 94640 AIRWAY INHALATION TREATMENT: CPT

## 2022-06-30 PROCEDURE — 6370000000 HC RX 637 (ALT 250 FOR IP): Performed by: EMERGENCY MEDICINE

## 2022-06-30 PROCEDURE — 6370000000 HC RX 637 (ALT 250 FOR IP): Performed by: INTERNAL MEDICINE

## 2022-06-30 PROCEDURE — 85027 COMPLETE CBC AUTOMATED: CPT

## 2022-06-30 PROCEDURE — 36415 COLL VENOUS BLD VENIPUNCTURE: CPT

## 2022-06-30 PROCEDURE — 6360000002 HC RX W HCPCS: Performed by: INTERNAL MEDICINE

## 2022-06-30 PROCEDURE — 82962 GLUCOSE BLOOD TEST: CPT

## 2022-06-30 PROCEDURE — 80048 BASIC METABOLIC PNL TOTAL CA: CPT

## 2022-06-30 RX ORDER — IPRATROPIUM BROMIDE AND ALBUTEROL SULFATE 2.5; .5 MG/3ML; MG/3ML
1 SOLUTION RESPIRATORY (INHALATION) 4 TIMES DAILY
Status: DISCONTINUED | OUTPATIENT
Start: 2022-06-30 | End: 2022-06-30 | Stop reason: HOSPADM

## 2022-06-30 RX ORDER — DEXAMETHASONE 0.5 MG/1
6 TABLET ORAL DAILY
Qty: 60 TABLET | Refills: 0 | Status: SHIPPED | OUTPATIENT
Start: 2022-06-30 | End: 2022-07-05

## 2022-06-30 RX ADMIN — IPRATROPIUM BROMIDE AND ALBUTEROL SULFATE 1 AMPULE: .5; 2.5 SOLUTION RESPIRATORY (INHALATION) at 15:02

## 2022-06-30 RX ADMIN — DEXAMETHASONE 6 MG: 4 TABLET ORAL at 09:31

## 2022-06-30 RX ADMIN — ENOXAPARIN SODIUM 30 MG: 100 INJECTION SUBCUTANEOUS at 09:32

## 2022-06-30 RX ADMIN — IPRATROPIUM BROMIDE AND ALBUTEROL SULFATE 1 AMPULE: .5; 2.5 SOLUTION RESPIRATORY (INHALATION) at 07:26

## 2022-06-30 RX ADMIN — IPRATROPIUM BROMIDE AND ALBUTEROL SULFATE 1 AMPULE: .5; 2.5 SOLUTION RESPIRATORY (INHALATION) at 10:54

## 2022-06-30 RX ADMIN — AMLODIPINE BESYLATE 10 MG: 10 TABLET ORAL at 09:32

## 2022-06-30 RX ADMIN — METFORMIN HYDROCHLORIDE 500 MG: 500 TABLET ORAL at 09:31

## 2022-06-30 NOTE — PROGRESS NOTES
Physician Progress Note      Cara Constantino  CSN #:                  [id number]  :                       1945  ADMIT DATE:       2022 8:35 PM  DISCH DATE:  RESPONDING  PROVIDER #:        Neeraj Stewart MD          QUERY TEXT:    Pt admitted with COVID-19 and noted to have Temp 100.5, HR into 100's, CRP   6-9.2, Procal 0.15 with documented Acute Respiratory Failure. If possible,   please document in progress notes and discharge summary if you are evaluating   and/or treating: The medical record reflects the following:  Risk Factors: COVID-19  Clinical Indicators: Per notes, patient admitted with COVID-19 and also noted   to have Acute Respiratory Failure. On admission, oral temp of 100.5. HR   90s-100's. CRP 6, 9.2, 4.5. Procalcitonin 0.15, 0.08. WBC wnl. Treatment: Decadron, Harlan, Tylenol, labs, vitals    Thank you,  Charmaine Jones, RN, BSN, CCDS, Clinical Documentation Improvement  Options provided:  -- Sepsis present on admission due to COVID-19 infection  -- Sepsis not present on admission due to COVID-19 infection  -- Covid-19 infection without sepsis  -- Other - I will add my own diagnosis  -- Disagree - Not applicable / Not valid  -- Disagree - Clinically unable to determine / Unknown  -- Refer to Clinical Documentation Reviewer    PROVIDER RESPONSE TEXT:    This patient has sepsis which was not present on admission due to COVID-19   infection.     Query created by: Pippa Pimentel on 2022 7:24 AM      Electronically signed by:  Neeraj Stewart MD 2022 3:53 PM

## 2022-06-30 NOTE — PROGRESS NOTES
CLINICAL PHARMACY NOTE: MEDS TO BEDS    Total # of Prescriptions Filled: 1   The following medications were delivered to the patient:  · Decadron 6 mg       Additional Documentation:

## 2022-06-30 NOTE — CARE COORDINATION
Care Coordination   68year old admitted with hypoxia secondary to COVID. Stable this date, no oxygen. Discharge order noted. SW called patient in room due to Covid isolation. She is visiting form [address] for a family re union. States she and 4-5 other family members have [employer]. She will discharge to her brothers home. No needs identified or requested. Family will transport.

## 2022-06-30 NOTE — DISCHARGE SUMMARY
Hospitalist Discharge Summary    Patient ID:  Jourdan Singh  [id number]  71 y.o.  1945    Admit date: 6/26/2022    Discharge date: 6/30/2022    Disposition: Back to home    Admission Diagnoses:   Patient Active Problem List   Diagnosis    Hypoxia       Discharge Diagnoses: Principal Problem:    Hypoxia  Resolved Problems:    * No resolved hospital problems. *      Code Status:  Full Code    Condition:  Stable     Discharge Diet: Diet:  ADULT DIET; Regular; 4 carb choices (60 gm/meal)      Hospital Course:   68 y.o. female who presented to [facility] with cough. Patient is visiting [facility] She then started having coughing issues. Symptoms have been going on for the last 2 days. She denies any wheezing or shortness of breath. Evaluation in the emergency room revealed hypoxia. She does not have a underlying history of lung disorder. Patient was then admitted because of COVID-19 infection and hypoxia. At the time of questioning she denies any significant issues. She has an underlying history of hypertension, hyperlipidemia, diabetes mellitus type 2.     Acute hypoxic respiratory failure  Resolved not requiring oxygen now  Continue Decadron, monitor for oxygen requirements  Currently on room air stable, will discharge back to home    Hypertension  Continue Norvasc     Diabetes mellitus  Continues on sliding scale     Hyperlipidemia:   Continue pravastatin     Patient currently stable and being discharged in stable state back to home       Discharge Medications:   Current Discharge Medication List      START taking these medications    Details   dexamethasone (DECADRON) 0.5 MG tablet Take 12 tablets by mouth daily for 5 days  Qty: 60 tablet, Refills: 0           Current Discharge Medication List        Current Discharge Medication List      CONTINUE these medications which have NOT CHANGED    Details   amLODIPine (NORVASC) 5 MG tablet Take 5 mg by mouth daily      metFORMIN (GLUCOPHAGE) 500 MG tablet Take 500 mg by mouth 2 times daily (with meals)      pravastatin (PRAVACHOL) 20 MG tablet Take 20 mg by mouth nightly           Current Discharge Medication List                Assessment on Discharge: Stable, improved       Discharge Exam:  BP (!) 142/76   Pulse 99   Temp 97.5 °F (36.4 °C) (Oral)   Resp 18   Ht 5' 2\" (1.575 m)   Wt 135 lb (61.2 kg)   SpO2 95%   BMI 24.69 kg/m²   Patient seen at bedside, no acute distress, no obvious difficulty breathing and no respiratory distress  Patient awake alert responding to my questions and commands  Neurological examination: Awake and alert into 3, no focal neurological deficit noticed  Extremity examination: No peripheral edema, no erythema or rashes  Abdominal examination: Not distended, no pain reported        Pertinent Studies During Hospital Stay:  Radiology:  CT ABDOMEN PELVIS W IV CONTRAST Additional Contrast? None    Result Date: 6/27/2022  EXAMINATION: CT OF THE ABDOMEN AND PELVIS WITH CONTRAST6/27/2022 1:11 am TECHNIQUE: CT of the abdomen and pelvis was performed with the administration of 100 cc Isovue intravenous contrast. Multiplanar reformatted images are provided for review. Automated exposure control, iterative reconstruction, and/or weight based adjustment of the mA/kV was utilized to reduce the radiation dose to as low as reasonably achievable. COMPARISON: None HISTORY: ORDERING SYSTEM PROVIDED HISTORY: Vomiting TECHNOLOGIST PROVIDED HISTORY: Reason for exam:->Vomiting Additional Contrast?->None Decision Support Exception - unselect if not a suspected or confirmed emergency medical condition->Emergency Medical Condition (MA) What reading provider will be dictating this exam?->CRC FINDINGS: THORACIC STRUCTURES: Unremarkable. LIVER:  The liver is normal in size, contour and attenuation. No focal mass. No intra or extrahepatic bile duct dilation.  GALL BLADDER: Cholecystectomy. SPLEEN:  Unremarkable. PANCREAS:  Unremarkable. ADRENAL GLANDS:  Unremarkable. ESOPHAGUS AND STOMACH:  Unremarkable. BOWEL: Small bowel:  Unremarkable. Large bowel: The colon and rectum are of normal course and caliber. The appendix is not visualized. There is no intraperitoneal free air or fluid. URINARY/GENITAL TRACT: Kidneys:  The kidneys enhance symmetrically. No evidence of hydronephrosis, renal calcifications or solid renal mass. Ureters: The ureters are normal course and caliber. There is no evidence of ureter calculus/calculi. URINARY BLADDER:  The urinary bladder is well distended without wall thickening or focal mass. REPRODUCTIVE ORGANS:  There is a fibroid uterus identified. BLOOD VESSELS: Unremarkable given patients age. LYMPH NODES:  No evidence of intraabdominal or intrapelvic lymphadenopathy. ABDOMINAL WALL & SOFT TISSUES:  Unremarkable. OSSEOUS STRUCTURES: No acute osseous lesion. No acute intraabdominal or intrapelvic pathology. Fibroid uterus. XR CHEST PORTABLE    Result Date: 6/26/2022  EXAMINATION: ONE XRAY VIEW OF THE CHEST 6/26/2022 8:57 pm COMPARISON: None. HISTORY: ORDERING SYSTEM PROVIDED HISTORY: hypoxia TECHNOLOGIST PROVIDED HISTORY: Reason for exam:->hypoxia What reading provider will be dictating this exam?->CRC FINDINGS: Cardiac size within normal limits. Pulmonary vascularity within normal is. Minimal subsegmental atelectasis. No pneumothorax or pleural effusion. No acute cardiopulmonary process     CTA CHEST W CONTRAST    Result Date: 6/27/2022  EXAMINATION: CTA OF THE CHEST 6/27/2022 1:11 am TECHNIQUE: CTA of the chest was performed after the administration of intravenous contrast.  Multiplanar reformatted images are provided for review. MIP images are provided for review.  Automated exposure control, iterative reconstruction, and/or weight based adjustment of the mA/kV was utilized to reduce the radiation dose to as low as reasonably achievable. COMPARISON: None. HISTORY: ORDERING SYSTEM PROVIDED HISTORY: Nausea vomiting hypoxic TECHNOLOGIST PROVIDED HISTORY: Reason for exam:->Nausea vomiting hypoxic Decision Support Exception - unselect if not a suspected or confirmed emergency medical condition->Emergency Medical Condition (MA) What reading provider will be dictating this exam?->CRC FINDINGS: Pulmonary Arteries: Pulmonary arteries are adequately opacified for evaluation. No evidence of intraluminal filling defect to suggest pulmonary embolism. Main pulmonary artery is normal in caliber. Mediastinum: No evidence of mediastinal lymphadenopathy. The heart and pericardium demonstrate no acute abnormality. There is no acute abnormality of the thoracic aorta. Lungs/pleura: The lungs are without acute process. No focal consolidation or pulmonary edema. No evidence of pleural effusion or pneumothorax. Upper Abdomen: Limited images of the upper abdomen are unremarkable. Soft Tissues/Bones: No acute bone or soft tissue abnormality. No evidence of pulmonary embolism or acute pulmonary abnormality.          Last Labs on Discharge:   Recent Results (from the past 24 hour(s))   POCT Glucose    Collection Time: 06/29/22  4:41 PM   Result Value Ref Range    Meter Glucose 186 (H) 74 - 99 mg/dL   POCT Glucose    Collection Time: 06/29/22 11:42 PM   Result Value Ref Range    Meter Glucose 194 (H) 74 - 99 mg/dL   Basic Metabolic Panel    Collection Time: 06/30/22  5:18 AM   Result Value Ref Range    Sodium 140 132 - 146 mmol/L    Potassium 4.2 3.5 - 5.0 mmol/L    Chloride 101 98 - 107 mmol/L    CO2 28 22 - 29 mmol/L    Anion Gap 11 7 - 16 mmol/L    Glucose 108 (H) 74 - 99 mg/dL    BUN 18 6 - 23 mg/dL    CREATININE 0.8 0.5 - 1.0 mg/dL    GFR Non-African American >60 >=60 mL/min/1.73    GFR African American >60     Calcium 9.5 8.6 - 10.2 mg/dL   CBC    Collection Time: 06/30/22  5:18 AM   Result Value Ref Range    WBC 7.2 4.5 - 11.5 E9/L    RBC 4.74 3.50 - 5.50 E12/L    Hemoglobin 12.9 11.5 - 15.5 g/dL    Hematocrit 40.9 34.0 - 48.0 %    MCV 86.3 80.0 - 99.9 fL    MCH 27.2 26.0 - 35.0 pg    MCHC 31.5 (L) 32.0 - 34.5 %    RDW 15.8 (H) 11.5 - 15.0 fL    Platelets 836 300 - 349 E9/L    MPV 9.5 7.0 - 12.0 fL   POCT Glucose    Collection Time: 06/30/22  6:50 AM   Result Value Ref Range    Meter Glucose 103 (H) 74 - 99 mg/dL         Follow up: No primary care provider on file. No future appointments. Note that over 30 minutes was spent in preparing discharge papers, discussing discharge with patient, medication review, etc.    Thank you No primary care provider on file. for the opportunity to be involved in this patient's care. If you have any questions or concerns please feel free to contact me at [phone]. Electronically signed by Brent Hagen MD on 6/30/2022 at 12:24 PM    NOTE: This report was transcribed using voice recognition software. Every effort was made to ensure accuracy; however, inadvertent computerized transcription errors may be present.

## 2022-07-02 LAB
BLOOD CULTURE, ROUTINE: NORMAL
CULTURE, BLOOD 2: NORMAL

## 2023-09-06 ENCOUNTER — APPOINTMENT (OUTPATIENT)
Dept: GENERAL RADIOLOGY | Age: 78
End: 2023-09-06
Payer: MEDICARE

## 2023-09-06 ENCOUNTER — HOSPITAL ENCOUNTER (EMERGENCY)
Age: 78
Discharge: HOME | End: 2023-09-06
Attending: EMERGENCY MEDICINE
Payer: MEDICARE

## 2023-09-06 VITALS
TEMPERATURE: 98 F | HEART RATE: 91 BPM | BODY MASS INDEX: 31.28 KG/M2 | HEIGHT: 62 IN | SYSTOLIC BLOOD PRESSURE: 136 MMHG | RESPIRATION RATE: 14 BRPM | WEIGHT: 170 LBS | DIASTOLIC BLOOD PRESSURE: 117 MMHG | OXYGEN SATURATION: 100 %

## 2023-09-06 DIAGNOSIS — U07.1 COVID: Primary | ICD-10-CM

## 2023-09-06 DIAGNOSIS — N30.00 ACUTE CYSTITIS WITHOUT HEMATURIA: ICD-10-CM

## 2023-09-06 LAB
ALBUMIN SERPL-MCNC: 3.7 G/DL (ref 3.5–5.2)
ALP SERPL-CCNC: 61 U/L (ref 35–104)
ALT SERPL-CCNC: 17 U/L (ref 0–32)
ANION GAP SERPL CALCULATED.3IONS-SCNC: 8 MMOL/L (ref 7–16)
AST SERPL-CCNC: 39 U/L (ref 0–31)
BACTERIA URNS QL MICRO: ABNORMAL
BASOPHILS # BLD: 0.05 K/UL (ref 0–0.2)
BASOPHILS NFR BLD: 1 % (ref 0–2)
BILIRUB SERPL-MCNC: 0.3 MG/DL (ref 0–1.2)
BILIRUB UR QL STRIP: NEGATIVE
BUN SERPL-MCNC: 10 MG/DL (ref 6–23)
CALCIUM SERPL-MCNC: 8.8 MG/DL (ref 8.6–10.2)
CHLORIDE SERPL-SCNC: 101 MMOL/L (ref 98–107)
CLARITY UR: CLEAR
CO2 SERPL-SCNC: 26 MMOL/L (ref 22–29)
COLOR UR: YELLOW
CREAT SERPL-MCNC: 0.8 MG/DL (ref 0.5–1)
EKG ATRIAL RATE: 89 BPM
EKG P-R INTERVAL: 128 MS
EKG Q-T INTERVAL: 334 MS
EKG QRS DURATION: 66 MS
EKG QTC CALCULATION (BAZETT): 406 MS
EKG R AXIS: -19 DEGREES
EKG T AXIS: 122 DEGREES
EKG VENTRICULAR RATE: 89 BPM
EOSINOPHIL # BLD: 0 K/UL (ref 0.05–0.5)
EOSINOPHILS RELATIVE PERCENT: 0 % (ref 0–6)
ERYTHROCYTE [DISTWIDTH] IN BLOOD BY AUTOMATED COUNT: 15.9 % (ref 11.5–15)
GFR SERPL CREATININE-BSD FRML MDRD: >60 ML/MIN/1.73M2
GLUCOSE SERPL-MCNC: 162 MG/DL (ref 74–99)
GLUCOSE UR STRIP-MCNC: NEGATIVE MG/DL
HCT VFR BLD AUTO: 41.1 % (ref 34–48)
HGB BLD-MCNC: 13.3 G/DL (ref 11.5–15.5)
HGB UR QL STRIP.AUTO: ABNORMAL
IMM GRANULOCYTES # BLD AUTO: <0.03 K/UL (ref 0–0.58)
IMM GRANULOCYTES NFR BLD: 0 % (ref 0–5)
INFLUENZA A BY PCR: NOT DETECTED
INFLUENZA B BY PCR: NOT DETECTED
KETONES UR STRIP-MCNC: NEGATIVE MG/DL
LEUKOCYTE ESTERASE UR QL STRIP: NEGATIVE
LYMPHOCYTES NFR BLD: 1.4 K/UL (ref 1.5–4)
LYMPHOCYTES RELATIVE PERCENT: 23 % (ref 20–42)
MAGNESIUM SERPL-MCNC: 2.2 MG/DL (ref 1.6–2.6)
MCH RBC QN AUTO: 27.8 PG (ref 26–35)
MCHC RBC AUTO-ENTMCNC: 32.4 G/DL (ref 32–34.5)
MCV RBC AUTO: 85.8 FL (ref 80–99.9)
MONOCYTES NFR BLD: 0.9 K/UL (ref 0.1–0.95)
MONOCYTES NFR BLD: 15 % (ref 2–12)
NEUTROPHILS NFR BLD: 61 % (ref 43–80)
NEUTS SEG NFR BLD: 3.67 K/UL (ref 1.8–7.3)
NITRITE UR QL STRIP: NEGATIVE
PH UR STRIP: 6 [PH] (ref 5–9)
PLATELET # BLD AUTO: 277 K/UL (ref 130–450)
PMV BLD AUTO: 9.3 FL (ref 7–12)
POTASSIUM SERPL-SCNC: 5.2 MMOL/L (ref 3.5–5)
PROT SERPL-MCNC: 7.9 G/DL (ref 6.4–8.3)
PROT UR STRIP-MCNC: NEGATIVE MG/DL
RBC # BLD AUTO: 4.79 M/UL (ref 3.5–5.5)
RBC #/AREA URNS HPF: ABNORMAL /HPF
REASON FOR REJECTION: NORMAL
SARS-COV-2 RDRP RESP QL NAA+PROBE: DETECTED
SODIUM SERPL-SCNC: 135 MMOL/L (ref 132–146)
SP GR UR STRIP: 1.01 (ref 1–1.03)
SPECIMEN DESCRIPTION: ABNORMAL
SPECIMEN SOURCE: NORMAL
TROPONIN I SERPL HS-MCNC: 10 NG/L (ref 0–9)
TSH SERPL DL<=0.05 MIU/L-ACNC: 1.41 UIU/ML (ref 0.27–4.2)
UROBILINOGEN UR STRIP-ACNC: 1 EU/DL (ref 0–1)
WBC #/AREA URNS HPF: ABNORMAL /HPF
WBC OTHER # BLD: 6 K/UL (ref 4.5–11.5)
ZZ NTE CLEAN UP: ORDERED TEST: NORMAL

## 2023-09-06 PROCEDURE — 81001 URINALYSIS AUTO W/SCOPE: CPT

## 2023-09-06 PROCEDURE — 85025 COMPLETE CBC W/AUTO DIFF WBC: CPT

## 2023-09-06 PROCEDURE — 87635 SARS-COV-2 COVID-19 AMP PRB: CPT

## 2023-09-06 PROCEDURE — 87502 INFLUENZA DNA AMP PROBE: CPT

## 2023-09-06 PROCEDURE — 99285 EMERGENCY DEPT VISIT HI MDM: CPT

## 2023-09-06 PROCEDURE — 93005 ELECTROCARDIOGRAM TRACING: CPT

## 2023-09-06 PROCEDURE — 71046 X-RAY EXAM CHEST 2 VIEWS: CPT

## 2023-09-06 PROCEDURE — 84484 ASSAY OF TROPONIN QUANT: CPT

## 2023-09-06 PROCEDURE — 93010 ELECTROCARDIOGRAM REPORT: CPT | Performed by: INTERNAL MEDICINE

## 2023-09-06 PROCEDURE — 80053 COMPREHEN METABOLIC PANEL: CPT

## 2023-09-06 PROCEDURE — 83735 ASSAY OF MAGNESIUM: CPT

## 2023-09-06 PROCEDURE — 84443 ASSAY THYROID STIM HORMONE: CPT

## 2023-09-06 RX ORDER — CEFDINIR 300 MG/1
300 CAPSULE ORAL 2 TIMES DAILY
Qty: 14 CAPSULE | Refills: 0 | Status: SHIPPED | OUTPATIENT
Start: 2023-09-06 | End: 2023-09-13

## 2023-09-06 ASSESSMENT — PAIN - FUNCTIONAL ASSESSMENT
PAIN_FUNCTIONAL_ASSESSMENT: NONE - DENIES PAIN
PAIN_FUNCTIONAL_ASSESSMENT: NONE - DENIES PAIN

## 2023-09-06 NOTE — ED PROVIDER NOTES
Department of Emergency Medicine     Written by: Radha Kong MD  Patient Name: Nathan Waterman  Admit Date: 2023  7:28 AM  MRN: [id number]                   : 1945      HPI  Chief Complaint   Patient presents with    Fatigue     Fatigue; generalized weakness. Pt. Has no other complaints      Pt is a 67 yo F who presents with several symptoms for the past 2 days. Pt relates that she has had a productive cough (clear sputum), fatigue, urinary frequency with incontinence, and N/V. Family members say that she has been complaining of tiredness for quite some time now and has not been as active. Patient denies F/C, chest pain, leg swelling, leg pain, abd pain, or headaches. Nursing notes were all reviewed and agreed with or any disagreements were addressed in the HPI. Review of systems:    Pertinent positives and negatives mentioned in the HPI/MDM. Physical Exam  Vitals and nursing note reviewed. Constitutional:       Appearance: Normal appearance. HENT:      Head: Normocephalic and atraumatic. Mouth/Throat:      Mouth: Mucous membranes are moist.      Pharynx: Oropharynx is clear. Eyes:      Extraocular Movements: Extraocular movements intact. Pupils: Pupils are equal, round, and reactive to light. Cardiovascular:      Rate and Rhythm: Normal rate and regular rhythm. Pulses: Normal pulses. Pulmonary:      Effort: Pulmonary effort is normal.      Breath sounds: Normal breath sounds. Abdominal:      General: Abdomen is flat. Palpations: Abdomen is soft. Musculoskeletal:      Cervical back: Normal range of motion. Skin:     General: Skin is warm and dry. Capillary Refill: Capillary refill takes less than 2 seconds. Neurological:      General: No focal deficit present. Mental Status: She is alert and oriented to person, place, and time.    Psychiatric:         Mood and Affect: Mood normal.         Behavior: Behavior normal.